# Patient Record
Sex: MALE | Race: WHITE | NOT HISPANIC OR LATINO | Employment: FULL TIME | URBAN - NONMETROPOLITAN AREA
[De-identification: names, ages, dates, MRNs, and addresses within clinical notes are randomized per-mention and may not be internally consistent; named-entity substitution may affect disease eponyms.]

---

## 2021-05-13 ENCOUNTER — OFFICE VISIT CONVERTED (OUTPATIENT)
Dept: FAMILY MEDICINE CLINIC | Age: 31
End: 2021-05-13
Attending: FAMILY MEDICINE

## 2021-05-17 ENCOUNTER — HOSPITAL ENCOUNTER (OUTPATIENT)
Dept: OTHER | Facility: HOSPITAL | Age: 31
Discharge: HOME | End: 2021-05-17
Attending: FAMILY MEDICINE

## 2021-05-17 LAB
ALBUMIN SERPL-MCNC: 4.3 G/DL (ref 3.5–5)
ALBUMIN/GLOB SERPL: 1.4 {RATIO} (ref 1.4–2.6)
ALP SERPL-CCNC: 66 U/L (ref 53–128)
ALT SERPL-CCNC: 38 U/L (ref 10–40)
ANION GAP SERPL CALC-SCNC: 14 MMOL/L (ref 8–19)
AST SERPL-CCNC: 22 U/L (ref 15–50)
BASOPHILS # BLD MANUAL: 0.04 10*3/UL (ref 0–0.2)
BASOPHILS NFR BLD MANUAL: 0.7 % (ref 0–3)
BILIRUB SERPL-MCNC: 0.43 MG/DL (ref 0.2–1.3)
BUN SERPL-MCNC: 14 MG/DL (ref 5–25)
BUN/CREAT SERPL: 17 {RATIO} (ref 6–20)
CALCIUM SERPL-MCNC: 9.1 MG/DL (ref 8.7–10.4)
CHLORIDE SERPL-SCNC: 101 MMOL/L (ref 99–111)
CHOLEST SERPL-MCNC: 227 MG/DL (ref 107–200)
CHOLEST/HDLC SERPL: 6 {RATIO} (ref 3–6)
CONV CO2: 26 MMOL/L (ref 22–32)
CONV TOTAL PROTEIN: 7.3 G/DL (ref 6.3–8.2)
CREAT UR-MCNC: 0.84 MG/DL (ref 0.7–1.2)
DEPRECATED RDW RBC AUTO: 42.7 FL
EOSINOPHIL # BLD MANUAL: 0.29 10*3/UL (ref 0–0.7)
EOSINOPHIL NFR BLD MANUAL: 5 % (ref 0–7)
ERYTHROCYTE [DISTWIDTH] IN BLOOD BY AUTOMATED COUNT: 12.3 % (ref 11.5–14.5)
GFR SERPLBLD BASED ON 1.73 SQ M-ARVRAT: >60 ML/MIN/{1.73_M2}
GLOBULIN UR ELPH-MCNC: 3 G/DL (ref 2–3.5)
GLUCOSE SERPL-MCNC: 92 MG/DL (ref 70–99)
GRANS (ABSOLUTE): 2.7 10*3/UL (ref 2–8)
GRANS: 46.9 % (ref 30–85)
HBA1C MFR BLD: 15.1 G/DL (ref 14–18)
HCT VFR BLD AUTO: 44.4 % (ref 42–52)
HDLC SERPL-MCNC: 38 MG/DL (ref 40–60)
IMM GRANULOCYTES # BLD: 0.02 10*3/UL (ref 0–0.54)
IMM GRANULOCYTES NFR BLD: 0.3 % (ref 0–0.43)
LDLC SERPL CALC-MCNC: 142 MG/DL (ref 70–100)
LYMPHOCYTES # BLD MANUAL: 2.14 10*3/UL (ref 1–5)
LYMPHOCYTES NFR BLD MANUAL: 9.9 % (ref 3–10)
MCH RBC QN AUTO: 31.6 PG (ref 27–31)
MCHC RBC AUTO-ENTMCNC: 34 G/DL (ref 33–37)
MCV RBC AUTO: 92.9 FL (ref 80–96)
MONOCYTES # BLD AUTO: 0.57 10*3/UL (ref 0.2–1.2)
OSMOLALITY SERPL CALC.SUM OF ELEC: 284 MOSM/KG (ref 273–304)
PLATELET # BLD AUTO: 235 10*3/UL (ref 130–400)
PMV BLD AUTO: 9.7 FL (ref 7.4–10.4)
POTASSIUM SERPL-SCNC: 4.2 MMOL/L (ref 3.5–5.3)
RBC # BLD AUTO: 4.78 10*6/UL (ref 4.7–6.1)
SODIUM SERPL-SCNC: 137 MMOL/L (ref 135–147)
TRIGL SERPL-MCNC: 236 MG/DL (ref 40–150)
TSH SERPL-ACNC: 1.62 M[IU]/L (ref 0.27–4.2)
VARIANT LYMPHS NFR BLD MANUAL: 37.2 % (ref 20–45)
VLDLC SERPL-MCNC: 47 MG/DL (ref 5–37)
WBC # BLD AUTO: 5.76 10*3/UL (ref 4.8–10.8)

## 2021-05-18 NOTE — PROGRESS NOTES
Clarence Muller  1990     Office/Outpatient Visit    Visit Date: Thu, May 13, 2021 04:06 pm    Provider: Joseluis Moralez MD (Assistant: Judith Ramirez, )    Location: [facility]        Electronically signed by Joseluis Moralez MD on  05/22/2021 06:26:42 PM                             Subjective:        CC: Mr. Muller is a 31 year old White male.  This is his first visit to the clinic.  discuss stomach issues, left foot pain         HPI:       Marv reports that he has been spearing seeing pain in his left foot/heel for quite some time now.  Pain is primary located at the base of the heel.  He denies any known inciting event or injury.  He is not currently take anything for symptoms          Concerning encounter for general adult medical examination without abnormal findings, his last physical exam was over 5 years ago.  He does not perform regular testicular self-exams.  He is current with his Td and influenza immunization.      Smoking Status:  Nonsmoker     ROS:     CONSTITUTIONAL:  Negative for chills, fatigue, fever, and weight change.      EYES:  Negative for blurred vision.      E/N/T:  Negative for ear pain and tinnitus.      CARDIOVASCULAR:  Negative for chest pain, dizziness, palpitations and edema.      RESPIRATORY:  Negative for dyspnea and cough.      GASTROINTESTINAL:  Negative for abdominal pain, constipation, diarrhea, heartburn, nausea and vomiting.      GENITOURINARY:  Negative for dysuria, hematuria and polyuria.      MUSCULOSKELETAL:  Positive for left foot pain.      INTEGUMENTARY:  Negative for rash.      NEUROLOGICAL:  Negative for headaches, paresthesias and weakness.      HEMATOLOGIC/LYMPHATIC:  Negative for easy bruising and excessive bleeding.      ENDOCRINE:  Negative for hair loss, heat/cold intolerance, polydipsia, and polyphagia.      PSYCHIATRIC:  Negative for anxiety, depression, and sleep disturbances.          Past Medical History / Family History / Social  "History:         Last Reviewed on 5/22/2021 06:26 PM by Joseluis Moralez    Past Medical History:             PAST MEDICAL HISTORY     UNREMARKABLE         Surgical History:         Positive for    Hernia Repair: left inguinal; ;         Family History:         Positive for Coronary Artery Disease, Hyperlipidemia, Hypertension and Myocardial Infarction;     Positive for Type 2 Diabetes;         Social History:     Occupation: ;     Marital Status: Engaged     Children: 1 child         Tobacco/Alcohol/Supplements:     Last Reviewed on 5/22/2021 06:26 PM by Joseluis Moralez    Tobacco: Former smoker         Substance Abuse History:     Last Reviewed on 5/22/2021 06:26 PM by Joseluis Moralez        Mental Health History:     Last Reviewed on 5/22/2021 06:26 PM by Joseluis Moralez        Communicable Diseases (eg STDs):     Last Reviewed on 5/22/2021 06:26 PM by Joseluis Moralez        Current Problems:     Last Reviewed on 5/22/2021 06:26 PM by Joseluis Moralez    Pain in left ankle and joints of left foot    Encounter for general adult medical examination without abnormal findings        Immunizations:     influenza, injectable, quadrivalent, preservative free 11/15/2020    SARS-COV-2 (COVID-19) vaccine, UNSPECIFIED 2/3/2021    SARS-COV-2 (COVID-19) vaccine, UNSPECIFIED 3/3/2021        Current Medications:     Last Reviewed on 5/22/2021 06:26 PM by Joseluis Moralez    None        Objective:        Vitals:         Current: 5/13/2021 4:13:47 PM    Ht:  5 ft, 11 in;  Wt: 236.4 lbs;  BMI: 33.0T: 96.7 F (temporal);  BP: 128/63 mm Hg (right arm, sitting);  P: 61 bpm (right arm (BP Cuff), sitting, regular)        Exams:     PHYSICAL EXAM:     GENERAL: Vitals recorded well developed, well nourished;  no apparent distress;     EYES: conjunctiva and cornea are normal;     RESPIRATORY: Clear to auscultation bilateally; no rales (\"crackles\") present; no rhonchi; no wheezes;     CARDIOVASCULAR: normal rate; rhythm is regular;  No murmurs, " clicks, gallops or rubs appreciated; no edema;     GASTROINTESTINAL: nontender; Soft and nondistended; normal bowel sounds; no organomegaly; no masses;     LYMPHATIC: no enlargement of cervical or facial nodes; no supraclavicular nodes;     SKIN:  No significant rashes, lesions or suspicious moles within limits of examination;     MUSCULOSKELETAL: No gross deformity or edema of left foot; Minimally tender to palpation;     NEUROLOGIC: Grossly intact; mental status: alert and oriented x 3;     PSYCHIATRIC: appropriate affect and demeanor; normal speech pattern; Normal behavior;         Assessment:         Z00.00   Encounter for general adult medical examination without abnormal findings       M25.572   Pain in left ankle and joints of left foot           ORDERS:         Lab Orders:       [id number]  [facility] PHYSICAL: CMP, CBC, TSH, LIPID: [id number], [id number]  (Send-Out)                      Plan:         Encounter for general adult medical examination without abnormal findings- Appropriate screenings and vaccinations were reviewed and offered as indicated.  Screening labs including CBC, CMP, TSH and lipid panel ordered.    LABORATORY:  Labs ordered to be performed today include PHYSICAL PANEL; CMP, CBC, TSH, LIPID.            Orders:       [id number]  [facility] PHYSICAL: CMP, CBC, TSH, LIPID: [id number], [id number]  (Send-Out)              Pain in left ankle and joints of left foot- Clinical picture appears to be consistent with tendinitis.  Ice, decreased activity and over-the-counter analgesics as needed.  If symptoms persist, will consider imaging and/or specialist referral.            Charge Capture:         Primary Diagnosis:     Z00.00  Encounter for general adult medical examination without abnormal findings           Orders:      [id number]  Preventive medicine, new patient, age 18-39 years  (In-House)              M25.572  Pain in left ankle and joints of left foot

## 2021-07-02 VITALS
SYSTOLIC BLOOD PRESSURE: 128 MMHG | BODY MASS INDEX: 33.1 KG/M2 | HEIGHT: 71 IN | HEART RATE: 61 BPM | DIASTOLIC BLOOD PRESSURE: 63 MMHG | TEMPERATURE: 96.7 F | WEIGHT: 236.4 LBS

## 2021-11-04 ENCOUNTER — OFFICE VISIT (OUTPATIENT)
Dept: FAMILY MEDICINE CLINIC | Age: 31
End: 2021-11-04

## 2021-11-04 VITALS
TEMPERATURE: 98.4 F | DIASTOLIC BLOOD PRESSURE: 76 MMHG | BODY MASS INDEX: 34.1 KG/M2 | HEIGHT: 71 IN | WEIGHT: 243.6 LBS | HEART RATE: 68 BPM | SYSTOLIC BLOOD PRESSURE: 115 MMHG

## 2021-11-04 DIAGNOSIS — D22.9 MULTIPLE ATYPICAL SKIN MOLES: Primary | ICD-10-CM

## 2021-11-04 PROCEDURE — 99213 OFFICE O/P EST LOW 20 MIN: CPT | Performed by: NURSE PRACTITIONER

## 2021-11-04 NOTE — PROGRESS NOTES
"Chief Complaint  Rash and Establish Care    Subjective    Patient is a 31 year old male in today with complaints of multiple moles and rashes that come and go. Patient reports an itchy rash that popped up in multiple areas over the summer but have now resolved.           Clarence Muller presents to [facility]  Rash  This is a recurrent problem. The problem has been resolved since onset. The rash is diffuse. The rash is characterized by itchiness and redness. He was exposed to a new detergent/soap. Past treatments include antihistamine. The treatment provided mild relief.       Objective   Vital Signs:   /76   Pulse 68   Temp 98.4 °F (36.9 °C) (Oral)   Ht 180.3 cm (70.98\")   Wt 110 kg (243 lb 9.6 oz)   BMI 33.99 kg/m²     Physical Exam  HENT:      Head: Normocephalic.   Cardiovascular:      Rate and Rhythm: Normal rate and regular rhythm.      Pulses: Normal pulses.      Heart sounds: Normal heart sounds.   Pulmonary:      Effort: Pulmonary effort is normal. No respiratory distress.      Breath sounds: Normal breath sounds. No stridor. No wheezing, rhonchi or rales.   Skin:     General: Skin is warm and dry.          Neurological:      Mental Status: He is alert and oriented to person, place, and time.   Psychiatric:         Mood and Affect: Mood normal.        Result Review :                 Assessment and Plan    Diagnoses and all orders for this visit:    1. Multiple atypical skin moles (Primary)  -     Ambulatory Referral to Dermatology        Follow Up   Return in about 6 months (around 5/4/2022), or if symptoms worsen or fail to improve.  Patient was given instructions and counseling regarding his condition or for health maintenance advice. Please see specific information pulled into the AVS if appropriate.       "

## 2023-06-30 PROBLEM — E66.9 OBESITY (BMI 30-39.9): Status: ACTIVE | Noted: 2023-06-30

## 2024-02-14 NOTE — PROGRESS NOTES
"Chief Complaint  Establish Care and Dizziness    Subjective          Clarence Muller presents to [facility]  History of Present Illness  He is here to establish care.  He has seen multiple providers by our practice.    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 He reports that the dizziness has greatly improved.    Elevated BP: He has been trying to work on lifestyle modifications through healthier eating and increasing his physical activity. He reports that he has chest pain, which he attributes to weight lifting. He reports that it is improving. The pain is reproducible with palpation. The pain is intermittent. No known triggers. It will last for \"just a little bit.\" He describes it as an ache.  He denies blurred vision, H/A, and pedal edema.      Objective   Vital Signs:   /76 (BP Location: Left arm, Patient Position: Sitting)   Pulse 67   Ht 180.3 cm (71\")   Wt 110 kg (243 lb)   SpO2 98% Comment: on room air  BMI 33.89 kg/m²       Vitals:    02/20/24 1438 02/20/24 1504   BP: 140/98 130/76   BP Location: Left arm Left arm   Patient Position: Sitting Sitting   Pulse: 67    SpO2: 98%  Comment: on room air    Weight: 110 kg (243 lb)    Height: 180.3 cm (71\")         Physical Exam  Constitutional:       General: He is not in acute distress.     Appearance: Normal appearance. He is normal weight.   HENT:      Head: Normocephalic.   Eyes:      Pupils: Pupils are equal, round, and reactive to light.      Visual Fields: Right eye visual fields normal and left eye visual fields normal.   Neck:      Trachea: Trachea normal.   Cardiovascular:      Rate and Rhythm: Normal rate and regular rhythm.      Heart sounds: Normal heart sounds.   Pulmonary:      Effort: Pulmonary effort is normal.      Breath sounds: Normal breath sounds and air entry.   Chest:      Chest wall: Tenderness present. "   Musculoskeletal:      Right lower leg: No edema.      Left lower leg: No edema.   Skin:     General: Skin is warm and dry.   Neurological:      Mental Status: He is alert and oriented to person, place, and time.   Psychiatric:         Mood and Affect: Mood and affect normal.         Behavior: Behavior normal.         Thought Content: Thought content normal.        Result Review :   The following data was reviewed by: SEYMOUR Nolan on 02/20/2024:                  Assessment and Plan    Diagnoses and all orders for this visit:    1. Elevated BP without diagnosis of hypertension (Primary)  Assessment & Plan:  He is going to work on lifestyle modifications to help lower his cholesterol and BP. I do recommend that he check his BP at home and see what it is running compared to the office setting.      2. Screening for thyroid disorder  -     TSH; Future    3. Preventative health care  -     CBC (No Diff); Future  -     Comprehensive Metabolic Panel; Future  -     Lipid Panel; Future  -     Hemoglobin A1c; Future  -     TSH; Future  -     Hepatitis C Antibody; Future    4. Screening for cholesterol level  -     Lipid Panel; Future    5. Screening for diabetes mellitus (DM)  -     Hemoglobin A1c; Future    6. Need for hepatitis C screening test  -     Hepatitis C Antibody; Future    7. Obesity (BMI 30-39.9)  Assessment & Plan:  Patient's (Body mass index is 33.89 kg/m².) indicates that they are obese (BMI >30) with health conditions that include  elevated BP  . Weight is unchanged. BMI  is above average; BMI management plan is completed. We discussed portion control and increasing exercise.       8. Dizziness  Comments:  Greatly improving. Continue current treatment.          Follow Up   Return for Annual physical.  Patient was given instructions and counseling regarding his condition or for health maintenance advice. Please see specific information pulled into the AVS if appropriate.

## 2024-02-20 ENCOUNTER — OFFICE VISIT (OUTPATIENT)
Dept: FAMILY MEDICINE CLINIC | Age: 34
End: 2024-02-20
Payer: COMMERCIAL

## 2024-02-20 VITALS
HEART RATE: 67 BPM | SYSTOLIC BLOOD PRESSURE: 130 MMHG | WEIGHT: 243 LBS | HEIGHT: 71 IN | OXYGEN SATURATION: 98 % | DIASTOLIC BLOOD PRESSURE: 76 MMHG | BODY MASS INDEX: 34.02 KG/M2

## 2024-02-20 DIAGNOSIS — R42 DIZZINESS: ICD-10-CM

## 2024-02-20 DIAGNOSIS — Z13.1 SCREENING FOR DIABETES MELLITUS (DM): ICD-10-CM

## 2024-02-20 DIAGNOSIS — R03.0 ELEVATED BP WITHOUT DIAGNOSIS OF HYPERTENSION: Primary | ICD-10-CM

## 2024-02-20 DIAGNOSIS — Z13.220 SCREENING FOR CHOLESTEROL LEVEL: ICD-10-CM

## 2024-02-20 DIAGNOSIS — Z11.59 NEED FOR HEPATITIS C SCREENING TEST: ICD-10-CM

## 2024-02-20 DIAGNOSIS — Z00.00 PREVENTATIVE HEALTH CARE: ICD-10-CM

## 2024-02-20 DIAGNOSIS — Z13.29 SCREENING FOR THYROID DISORDER: ICD-10-CM

## 2024-02-20 DIAGNOSIS — E66.9 OBESITY (BMI 30-39.9): ICD-10-CM

## 2024-02-20 RX ORDER — PREDNISONE 20 MG/1
TABLET ORAL
COMMUNITY
Start: 2024-02-16

## 2024-02-20 RX ORDER — OMEPRAZOLE 20 MG/1
20 CAPSULE, DELAYED RELEASE ORAL DAILY
COMMUNITY

## 2024-02-20 RX ORDER — CIPROFLOXACIN AND DEXAMETHASONE 3; 1 MG/ML; MG/ML
SUSPENSION/ DROPS AURICULAR (OTIC)
COMMUNITY
Start: 2024-02-17

## 2024-02-20 NOTE — ASSESSMENT & PLAN NOTE
He is going to work on lifestyle modifications to help lower his cholesterol and BP. I do recommend that he check his BP at home and see what it is running compared to the office setting.

## 2024-02-20 NOTE — ASSESSMENT & PLAN NOTE
Patient's (Body mass index is 33.89 kg/m².) indicates that they are obese (BMI >30) with health conditions that include  elevated BP  . Weight is unchanged. BMI  is above average; BMI management plan is completed. We discussed portion control and increasing exercise.

## 2024-03-01 ENCOUNTER — PATIENT ROUNDING (BHMG ONLY) (OUTPATIENT)
Dept: FAMILY MEDICINE CLINIC | Age: 34
End: 2024-03-01
Payer: COMMERCIAL

## 2024-03-01 ENCOUNTER — LAB (OUTPATIENT)
Dept: LAB | Facility: HOSPITAL | Age: 34
End: 2024-03-01
Payer: COMMERCIAL

## 2024-03-01 DIAGNOSIS — Z13.29 SCREENING FOR THYROID DISORDER: ICD-10-CM

## 2024-03-01 DIAGNOSIS — Z11.59 NEED FOR HEPATITIS C SCREENING TEST: ICD-10-CM

## 2024-03-01 DIAGNOSIS — Z13.220 SCREENING FOR CHOLESTEROL LEVEL: ICD-10-CM

## 2024-03-01 DIAGNOSIS — Z00.00 PREVENTATIVE HEALTH CARE: ICD-10-CM

## 2024-03-01 DIAGNOSIS — Z13.1 SCREENING FOR DIABETES MELLITUS (DM): ICD-10-CM

## 2024-03-01 LAB
ALBUMIN SERPL-MCNC: 4.5 G/DL (ref 3.5–5.2)
ALBUMIN/GLOB SERPL: 1.8 G/DL
ALP SERPL-CCNC: 71 U/L (ref 39–117)
ALT SERPL W P-5'-P-CCNC: 22 U/L (ref 1–41)
ANION GAP SERPL CALCULATED.3IONS-SCNC: 10 MMOL/L (ref 5–15)
AST SERPL-CCNC: 18 U/L (ref 1–40)
BILIRUB SERPL-MCNC: 0.4 MG/DL (ref 0–1.2)
BUN SERPL-MCNC: 12 MG/DL (ref 6–20)
BUN/CREAT SERPL: 14.1 (ref 7–25)
CALCIUM SPEC-SCNC: 9.3 MG/DL (ref 8.6–10.5)
CHLORIDE SERPL-SCNC: 105 MMOL/L (ref 98–107)
CHOLEST SERPL-MCNC: 226 MG/DL (ref 0–200)
CO2 SERPL-SCNC: 24 MMOL/L (ref 22–29)
CREAT SERPL-MCNC: 0.85 MG/DL (ref 0.76–1.27)
DEPRECATED RDW RBC AUTO: 44.1 FL (ref 37–54)
EGFRCR SERPLBLD CKD-EPI 2021: 118.4 ML/MIN/1.73
ERYTHROCYTE [DISTWIDTH] IN BLOOD BY AUTOMATED COUNT: 12.8 % (ref 12.3–15.4)
GLOBULIN UR ELPH-MCNC: 2.5 GM/DL
GLUCOSE SERPL-MCNC: 93 MG/DL (ref 65–99)
HBA1C MFR BLD: 5.7 % (ref 4.8–5.6)
HCT VFR BLD AUTO: 46.8 % (ref 37.5–51)
HCV AB SER DONR QL: NORMAL
HDLC SERPL-MCNC: 33 MG/DL (ref 40–60)
HGB BLD-MCNC: 15.5 G/DL (ref 13–17.7)
LDLC SERPL CALC-MCNC: 162 MG/DL (ref 0–100)
LDLC/HDLC SERPL: 4.83 {RATIO}
MCH RBC QN AUTO: 30.6 PG (ref 26.6–33)
MCHC RBC AUTO-ENTMCNC: 33.1 G/DL (ref 31.5–35.7)
MCV RBC AUTO: 92.5 FL (ref 79–97)
PLATELET # BLD AUTO: 227 10*3/MM3 (ref 140–450)
PMV BLD AUTO: 9.4 FL (ref 6–12)
POTASSIUM SERPL-SCNC: 4.4 MMOL/L (ref 3.5–5.2)
PROT SERPL-MCNC: 7 G/DL (ref 6–8.5)
RBC # BLD AUTO: 5.06 10*6/MM3 (ref 4.14–5.8)
SODIUM SERPL-SCNC: 139 MMOL/L (ref 136–145)
TRIGL SERPL-MCNC: 168 MG/DL (ref 0–150)
TSH SERPL DL<=0.05 MIU/L-ACNC: 0.9 UIU/ML (ref 0.27–4.2)
VLDLC SERPL-MCNC: 31 MG/DL (ref 5–40)
WBC NRBC COR # BLD AUTO: 4.34 10*3/MM3 (ref 3.4–10.8)

## 2024-03-01 PROCEDURE — 86803 HEPATITIS C AB TEST: CPT

## 2024-03-01 PROCEDURE — 36415 COLL VENOUS BLD VENIPUNCTURE: CPT

## 2024-03-01 PROCEDURE — 80050 GENERAL HEALTH PANEL: CPT

## 2024-03-01 PROCEDURE — 83036 HEMOGLOBIN GLYCOSYLATED A1C: CPT

## 2024-03-01 PROCEDURE — 80061 LIPID PANEL: CPT

## 2024-03-01 NOTE — PROGRESS NOTES
March 1, 2024    Hello, may I speak with Clarence Muller?    My name is AJ     I am  with [facility]  [address]  [facility] [place] [address] [phone].    Before we get started may I verify your date of birth? 1990      I am calling to officially welcome you to our practice and ask about your recent visit. Is this a good time to talk? YES    Tell me about your visit with us. What things went well?  My visit was great. I really liked Stella Reed, she's good.       We're always looking for ways to make our patients' experiences even better. Do you have recommendations on ways we may improve?  NO    Overall were you satisfied with your first visit to our practice? YES       I appreciate you taking the time to speak with me today. Is there anything else I can do for you? NO      Thank you, and have a great day.

## 2024-06-24 PROBLEM — R73.03 PREDIABETES: Status: ACTIVE | Noted: 2024-06-24

## 2024-06-24 NOTE — PROGRESS NOTES
"Chief Complaint  Annual Exam    Subjective          Clarence Muller presents to [facility]  History of Present Illness  He is here for physical.    He thinks that his tetanus is UTD.  He has had 3 doses of the COVID-vaccine; he's not interested in anymore vaccines. He doesn't do testicular self exams. He goes to the dentist regularly. He is trying to work on his diet. He is restarting to exercise. He doesn't drink a lot of water daily.      He reports that his dizziness has improved with Zyrtec.       Objective   Vital Signs:   /69 (BP Location: Left arm, Patient Position: Sitting)   Pulse 84   Ht 180.3 cm (71\")   Wt 112 kg (246 lb)   BMI 34.31 kg/m²     Physical Exam  Constitutional:       General: He is not in acute distress.     Appearance: Normal appearance. He is well-developed. He is obese.   HENT:      Head: Normocephalic.      Right Ear: Tympanic membrane, ear canal and external ear normal.      Left Ear: Tympanic membrane, ear canal and external ear normal.      Mouth/Throat:      Mouth: Mucous membranes are moist.      Pharynx: Oropharynx is clear. No oropharyngeal exudate or posterior oropharyngeal erythema.   Eyes:      Extraocular Movements: Extraocular movements intact.      Conjunctiva/sclera: Conjunctivae normal.      Pupils: Pupils are equal, round, and reactive to light.      Visual Fields: Right eye visual fields normal and left eye visual fields normal.   Cardiovascular:      Rate and Rhythm: Normal rate and regular rhythm.      Heart sounds: Normal heart sounds.   Pulmonary:      Effort: Pulmonary effort is normal. No retractions.      Breath sounds: Normal breath sounds and air entry.   Abdominal:      General: Abdomen is flat. Bowel sounds are normal. There is no distension.      Palpations: Abdomen is soft. There is no mass.      Tenderness: There is no abdominal tenderness.   Musculoskeletal:         General: Normal range of motion.      Cervical " back: Normal range of motion and neck supple.      Right lower leg: No edema.      Left lower leg: No edema.   Lymphadenopathy:      Cervical: No cervical adenopathy.   Skin:     General: Skin is warm and dry.   Neurological:      Mental Status: He is alert and oriented to person, place, and time.      Coordination: Coordination normal.      Gait: Gait normal.   Psychiatric:         Mood and Affect: Mood and affect normal.         Behavior: Behavior normal.         Thought Content: Thought content normal.        Result Review :   The following data was reviewed by: SEYMOUR Nolan on 07/02/2024:                  Assessment and Plan    Diagnoses and all orders for this visit:    1. Annual physical exam (Primary)  -     CBC (No Diff); Future  -     Comprehensive Metabolic Panel; Future  -     Lipid Panel; Future  -     Hemoglobin A1c; Future  -     TSH; Future    2. Preventative health care  -     CBC (No Diff); Future  -     Comprehensive Metabolic Panel; Future  -     Lipid Panel; Future  -     Hemoglobin A1c; Future  -     TSH; Future    3. Screening for diabetes mellitus (DM)  -     Hemoglobin A1c; Future    4. Screening for cholesterol level  -     Lipid Panel; Future    5. Screening for thyroid disorder  -     TSH; Future    Preventative measures discussed.      Follow Up   Return in about 6 months (around 1/2/2025) for prediabetes.  Patient was given instructions and counseling regarding his condition or for health maintenance advice. Please see specific information pulled into the AVS if appropriate.

## 2024-07-02 ENCOUNTER — OFFICE VISIT (OUTPATIENT)
Dept: FAMILY MEDICINE CLINIC | Age: 34
End: 2024-07-02
Payer: COMMERCIAL

## 2024-07-02 VITALS
DIASTOLIC BLOOD PRESSURE: 69 MMHG | HEART RATE: 84 BPM | HEIGHT: 71 IN | SYSTOLIC BLOOD PRESSURE: 117 MMHG | WEIGHT: 246 LBS | BODY MASS INDEX: 34.44 KG/M2

## 2024-07-02 DIAGNOSIS — Z13.29 SCREENING FOR THYROID DISORDER: ICD-10-CM

## 2024-07-02 DIAGNOSIS — Z13.1 SCREENING FOR DIABETES MELLITUS (DM): ICD-10-CM

## 2024-07-02 DIAGNOSIS — Z00.00 ANNUAL PHYSICAL EXAM: Primary | ICD-10-CM

## 2024-07-02 DIAGNOSIS — Z00.00 PREVENTATIVE HEALTH CARE: ICD-10-CM

## 2024-07-02 DIAGNOSIS — Z13.220 SCREENING FOR CHOLESTEROL LEVEL: ICD-10-CM

## 2024-07-02 PROCEDURE — 99395 PREV VISIT EST AGE 18-39: CPT | Performed by: NURSE PRACTITIONER

## 2024-07-02 RX ORDER — CETIRIZINE HYDROCHLORIDE 10 MG/1
10 TABLET ORAL DAILY
COMMUNITY

## 2024-07-03 ENCOUNTER — LAB (OUTPATIENT)
Dept: LAB | Facility: HOSPITAL | Age: 34
End: 2024-07-03
Payer: COMMERCIAL

## 2024-07-03 DIAGNOSIS — Z13.1 SCREENING FOR DIABETES MELLITUS (DM): ICD-10-CM

## 2024-07-03 DIAGNOSIS — Z13.220 SCREENING FOR CHOLESTEROL LEVEL: ICD-10-CM

## 2024-07-03 DIAGNOSIS — Z00.00 PREVENTATIVE HEALTH CARE: ICD-10-CM

## 2024-07-03 DIAGNOSIS — Z13.29 SCREENING FOR THYROID DISORDER: ICD-10-CM

## 2024-07-03 DIAGNOSIS — Z00.00 ANNUAL PHYSICAL EXAM: ICD-10-CM

## 2024-07-03 LAB
ALBUMIN SERPL-MCNC: 4.6 G/DL (ref 3.5–5.2)
ALBUMIN/GLOB SERPL: 1.8 G/DL
ALP SERPL-CCNC: 63 U/L (ref 39–117)
ALT SERPL W P-5'-P-CCNC: 42 U/L (ref 1–41)
ANION GAP SERPL CALCULATED.3IONS-SCNC: 12.4 MMOL/L (ref 5–15)
AST SERPL-CCNC: 24 U/L (ref 1–40)
BILIRUB SERPL-MCNC: 0.4 MG/DL (ref 0–1.2)
BUN SERPL-MCNC: 13 MG/DL (ref 6–20)
BUN/CREAT SERPL: 15.3 (ref 7–25)
CALCIUM SPEC-SCNC: 9.6 MG/DL (ref 8.6–10.5)
CHLORIDE SERPL-SCNC: 101 MMOL/L (ref 98–107)
CHOLEST SERPL-MCNC: 296 MG/DL (ref 0–200)
CO2 SERPL-SCNC: 24.6 MMOL/L (ref 22–29)
CREAT SERPL-MCNC: 0.85 MG/DL (ref 0.76–1.27)
DEPRECATED RDW RBC AUTO: 42.2 FL (ref 37–54)
EGFRCR SERPLBLD CKD-EPI 2021: 118.4 ML/MIN/1.73
ERYTHROCYTE [DISTWIDTH] IN BLOOD BY AUTOMATED COUNT: 12.6 % (ref 12.3–15.4)
GLOBULIN UR ELPH-MCNC: 2.6 GM/DL
GLUCOSE SERPL-MCNC: 85 MG/DL (ref 65–99)
HBA1C MFR BLD: 5.4 % (ref 4.8–5.6)
HCT VFR BLD AUTO: 46.9 % (ref 37.5–51)
HDLC SERPL-MCNC: 46 MG/DL (ref 40–60)
HGB BLD-MCNC: 16.1 G/DL (ref 13–17.7)
LDLC SERPL CALC-MCNC: 210 MG/DL (ref 0–100)
LDLC/HDLC SERPL: 4.53 {RATIO}
MCH RBC QN AUTO: 31.4 PG (ref 26.6–33)
MCHC RBC AUTO-ENTMCNC: 34.3 G/DL (ref 31.5–35.7)
MCV RBC AUTO: 91.4 FL (ref 79–97)
PLATELET # BLD AUTO: 224 10*3/MM3 (ref 140–450)
PMV BLD AUTO: 9.6 FL (ref 6–12)
POTASSIUM SERPL-SCNC: 4.5 MMOL/L (ref 3.5–5.2)
PROT SERPL-MCNC: 7.2 G/DL (ref 6–8.5)
RBC # BLD AUTO: 5.13 10*6/MM3 (ref 4.14–5.8)
SODIUM SERPL-SCNC: 138 MMOL/L (ref 136–145)
TRIGL SERPL-MCNC: 208 MG/DL (ref 0–150)
TSH SERPL DL<=0.05 MIU/L-ACNC: 1.51 UIU/ML (ref 0.27–4.2)
VLDLC SERPL-MCNC: 40 MG/DL (ref 5–40)
WBC NRBC COR # BLD AUTO: 6.98 10*3/MM3 (ref 3.4–10.8)

## 2024-07-03 PROCEDURE — 80050 GENERAL HEALTH PANEL: CPT

## 2024-07-03 PROCEDURE — 80061 LIPID PANEL: CPT

## 2024-07-03 PROCEDURE — 83036 HEMOGLOBIN GLYCOSYLATED A1C: CPT

## 2024-07-03 PROCEDURE — 36415 COLL VENOUS BLD VENIPUNCTURE: CPT

## 2024-07-09 DIAGNOSIS — E78.2 MIXED HYPERLIPIDEMIA: Primary | ICD-10-CM

## 2024-07-09 RX ORDER — ATORVASTATIN CALCIUM 20 MG/1
20 TABLET, FILM COATED ORAL DAILY
Qty: 90 TABLET | Refills: 1 | Status: SHIPPED | OUTPATIENT
Start: 2024-07-09

## 2024-12-16 NOTE — PROGRESS NOTES
"Chief Complaint  Prediabetes and Hyperlipidemia    Subjective          Clarence Muller presents to [facility]  History of Present Illness  He returns for follow-up.    HLD: He is taking atorvastatin 20 mg daily.  His last lipid panel from July showed his cholesterol 296, triglycerides 208, and .  He was started on atorvastatin after that. He reports that he has some soreness.     His wife reports that he is having a lot snoring and would like for him to complete a sleep study.     His A1c in the past was 5.7, but his last A1c was 5.4.      Objective   Vital Signs:   /67 (BP Location: Right arm, Patient Position: Sitting, Cuff Size: Large Adult)   Pulse 65   Temp 97.5 °F (36.4 °C) (Oral)   Ht 180.3 cm (71\")   Wt 113 kg (248 lb 3.2 oz)   SpO2 95%   BMI 34.62 kg/m²     Physical Exam  Constitutional:       General: He is not in acute distress.     Appearance: Normal appearance. He is normal weight.   HENT:      Head: Normocephalic.   Eyes:      Pupils: Pupils are equal, round, and reactive to light.      Visual Fields: Right eye visual fields normal and left eye visual fields normal.   Neck:      Trachea: Trachea normal.   Cardiovascular:      Rate and Rhythm: Normal rate and regular rhythm.      Heart sounds: Normal heart sounds.   Pulmonary:      Effort: Pulmonary effort is normal.      Breath sounds: Normal breath sounds and air entry.   Musculoskeletal:      Right lower leg: No edema.      Left lower leg: No edema.   Skin:     General: Skin is warm and dry.   Neurological:      Mental Status: He is alert and oriented to person, place, and time.   Psychiatric:         Mood and Affect: Mood and affect normal.         Behavior: Behavior normal.         Thought Content: Thought content normal.        Result Review :   The following data was reviewed by: SEYMOUR Nolan on 01/03/2025:                  Assessment and Plan    Diagnoses and all orders for this " visit:    1. Mixed hyperlipidemia (Primary)  Assessment & Plan:   We have discussed possibly switching to pravastatin.  He would like to see where his lipid panel is.    Orders:  -     CBC (No Diff); Future  -     Comprehensive Metabolic Panel; Future  -     Lipid Panel; Future    2. Screening for diabetes mellitus (DM)  -     Hemoglobin A1c; Future    3. Need for vaccination  -     Fluzone >6mos ([id number])    4. Snoring  -     Ambulatory Referral to Sleep Medicine                Follow Up   Return in about 6 months (around 7/3/2025).  Patient was given instructions and counseling regarding his condition or for health maintenance advice. Please see specific information pulled into the AVS if appropriate.

## 2025-01-03 ENCOUNTER — OFFICE VISIT (OUTPATIENT)
Dept: FAMILY MEDICINE CLINIC | Age: 35
End: 2025-01-03
Payer: COMMERCIAL

## 2025-01-03 VITALS
OXYGEN SATURATION: 95 % | TEMPERATURE: 97.5 F | HEART RATE: 65 BPM | WEIGHT: 248.2 LBS | BODY MASS INDEX: 34.75 KG/M2 | DIASTOLIC BLOOD PRESSURE: 67 MMHG | SYSTOLIC BLOOD PRESSURE: 121 MMHG | HEIGHT: 71 IN

## 2025-01-03 DIAGNOSIS — Z13.1 SCREENING FOR DIABETES MELLITUS (DM): ICD-10-CM

## 2025-01-03 DIAGNOSIS — E78.2 MIXED HYPERLIPIDEMIA: Primary | ICD-10-CM

## 2025-01-03 DIAGNOSIS — R06.83 SNORING: ICD-10-CM

## 2025-01-03 DIAGNOSIS — Z23 NEED FOR VACCINATION: ICD-10-CM

## 2025-01-03 PROCEDURE — 99213 OFFICE O/P EST LOW 20 MIN: CPT | Performed by: NURSE PRACTITIONER

## 2025-01-03 PROCEDURE — 90471 IMMUNIZATION ADMIN: CPT | Performed by: NURSE PRACTITIONER

## 2025-01-03 PROCEDURE — 90656 IIV3 VACC NO PRSV 0.5 ML IM: CPT | Performed by: NURSE PRACTITIONER

## 2025-01-03 NOTE — ASSESSMENT & PLAN NOTE
We have discussed possibly switching to pravastatin.  He would like to see where his lipid panel is.

## 2025-01-08 ENCOUNTER — OFFICE VISIT (OUTPATIENT)
Dept: SLEEP MEDICINE | Facility: HOSPITAL | Age: 35
End: 2025-01-08
Payer: COMMERCIAL

## 2025-01-08 VITALS
BODY MASS INDEX: 34.8 KG/M2 | DIASTOLIC BLOOD PRESSURE: 70 MMHG | SYSTOLIC BLOOD PRESSURE: 112 MMHG | WEIGHT: 248.6 LBS | HEIGHT: 71 IN | OXYGEN SATURATION: 95 % | HEART RATE: 83 BPM

## 2025-01-08 DIAGNOSIS — R29.818 SUSPECTED SLEEP APNEA: Primary | ICD-10-CM

## 2025-01-08 DIAGNOSIS — R06.83 SNORING: ICD-10-CM

## 2025-01-08 DIAGNOSIS — R73.03 PREDIABETES: ICD-10-CM

## 2025-01-08 DIAGNOSIS — G47.63 SLEEP-RELATED BRUXISM: ICD-10-CM

## 2025-01-08 DIAGNOSIS — R03.0 ELEVATED BP WITHOUT DIAGNOSIS OF HYPERTENSION: ICD-10-CM

## 2025-01-08 DIAGNOSIS — E66.9 OBESITY (BMI 30-39.9): ICD-10-CM

## 2025-01-08 PROCEDURE — G0463 HOSPITAL OUTPT CLINIC VISIT: HCPCS

## 2025-01-08 NOTE — PROGRESS NOTES
Sleep Consultation    Patient Name: Clarence Muller  Age/Sex: 34 y.o. male  : 1990  MRN: [id number]    Date of Encounter Visit: 2025  Encounter Provider: Sally Smith MD  Referring Provider: Ingris Holley*  Place of Service: [facility]  Patient Care Team:  Ingris Reed APRN as PCP - General (Nurse Practitioner)    Subjective:     Reason for Consult: Obstructive sleep apnea    History of Present Illness:  Clarence Muller is a 34 y.o. male is here for evaluation of ADELFO due to suggestive symptoms.    Patient complains of daytime fatigue and sleepiness with an Sal[place] Sleepiness Scale (ESS) of 7.  Patient complains of loud snoring in all position but no witnessed apnea  Wife is a respiratory therapist  Patient has some nocturnal bruxism  Denies any symptoms of restless leg syndrome.   Patient denies any cataplexy, sleep paralysis or other symptoms to suggest narcolepsy.  Patient denies any parasomnias.  Denies any history of seizure disorder or recent head trauma.  Patient spends adequate amount of time in bed with no evidence of sleep restriction or improper sleep hygiene.  Typical bedtime is around 11 PM, wake up time is 7 in the morning with 7-8 hours of sleep in between, sleep onset up to 30 minutes and patient wakes up feeling little groggy  He loves his caffeine, he does have a cup of coffee in the morning and he  take some caffeine powder in the afternoon.  Former smoker quit smoking 8 years ago  Consumes alcohol mainly over the weekends  Positive history of recreational drug use in the past starting in  Comorbidities include: elevated BP, prediabetes, obesity     Review of Systems:   A twelve-system review was conducted and was negative except for the following: frequent heartburn .        Past Medical History:  Past Medical History:   Diagnosis Date    GERD (gastroesophageal reflux disease)     Hyperlipidemia        Past Surgical History:  "  Procedure Laterality Date    ENDOSCOPY      HERNIA REPAIR         Home Medications:     Current Outpatient Medications:     aluminum hydroxide-mag carbonate (GAVISCON EXTRA RELIEF) 160-105 MG chewable tablet chewable tablet, Chew 4 (Four) Times a Day With Meals & at Bedtime., Disp: , Rfl:     atorvastatin (LIPITOR) 20 MG tablet, Take 1 tablet by mouth Daily., Disp: 90 tablet, Rfl: 1    omeprazole (priLOSEC) 20 MG capsule, Take 1 capsule by mouth Daily., Disp: , Rfl:     cetirizine (ZyrTEC Allergy) 10 MG tablet, Take 1 tablet by mouth Daily. (Patient not taking: Reported on 1/8/2025), Disp: , Rfl:     Allergies:  No Known Allergies    Past Social History:  Social History     Socioeconomic History    Marital status:    Tobacco Use    Smoking status: Never    Smokeless tobacco: Former   Vaping Use    Vaping status: Never Used   Substance and Sexual Activity    Alcohol use: Yes     Comment: social     Drug use: Never    Sexual activity: Defer       Past Family History:  Family History   Problem Relation Age of Onset    No Known Problems Mother     Alcohol abuse Father     Hyperlipidemia Father     Heart attack Father     Pancreatitis Father     Liver disease Father     Hyperlipidemia Paternal Grandmother     Hyperlipidemia Paternal Grandfather      Positive for snoring and several family members but none of them was formally evaluated with a sleep study.  Objective:        Vital Signs:   Visit Vitals  /70   Pulse 83   Ht 180.3 cm (71\")   Wt 113 kg (248 lb 9.6 oz)   SpO2 95%   BMI 34.67 kg/m²     Wt Readings from Last 3 Encounters:   01/08/25 113 kg (248 lb 9.6 oz)   01/03/25 113 kg (248 lb 3.2 oz)   07/02/24 112 kg (246 lb)     Neck Circumference: 16.5 inches    Physical Exam:   GEN:  No acute distress, alert, cooperative, well developed, obese   EYES:   Sclerae clear. No icterus. PERRL. Normal EOM  ENT:   External ears/nose normal, no oral lesions, no thrush, mucous membranes moist, Septum midline. " Mallampati III airway. Redundant membranous soft palate, enlarged uvula   NECK:  Supple, midline trachea, no JVD  LUNGS: Normal chest on inspection, CTAB, no wheezes. No rhonchi. No crackles. Respirations regular, even and unlabored.   CV:  Regular rhythm and rate. Normal S1/S2. No murmurs, gallops, or rubs noted.  ABD:  Soft, nontender and nondistended. Normal bowel sounds. No guarding  EXT:  Moves all extremities well. No cyanosis. No redness. No edema.   Skin: Dry, intact, no bleeding      Diagnostic Data:  No prior sleep studies performed to review     Assessment and Plan:       ICD-10-CM ICD-9-CM   1. Suspected sleep apnea  R29.818 781.99   2. Snoring  R06.83 786.09   3. Obesity (BMI 30-39.9)  E66.9 278.00   4. Sleep-related bruxism  G47.63 327.53   5. Prediabetes  R73.03 790.29   6. Elevated BP without diagnosis of hypertension  R03.0 796.2       Recommendations:     Patient is a good candidate for the home sleep study which will be ordered today  If the home sleep study is inconclusive or nondiagnostic, we will consider the in-lab sleep study  Patient is agreeable with the CPAP therapy and will be initiated accordingly      Patient was educated in depth about ADELFO and cardiovascular consequences if left untreated, including but not limited to CHF, CAD, arrhythmias, CVA, and/or HTN. Education also provided about the diagnostic tools for ADELFO, including the polysomnography and the treatment modalities, including the CPAP.     If patient has obstructive sleep apnea the recommend treatment is CPAP and will start CPAP and patient will follow up within 31-90 days after starting CPAP for compliance review.   Will address alternative treatment option if intolerant to CPAP     Adherence to the CPAP is a key factor in successful treatment of ADELFO and the patient was encouraged to contact us in case of problem with the CPAP or the mask that can limit the tolerance of the compliance with the therapy.    Patient was educated  about the impact of obesity on sleep apnea and the benefit of weight loss and weight loss was recommended    Orders Placed This Encounter   Procedures    Home Sleep Study     No orders of the defined types were placed in this encounter.     Return in about 3 months (around 4/8/2025).    Sally Smith MD   [facility]   01/08/25  16:08 EST    Dictated utilizing Dragon dictation

## 2025-01-16 ENCOUNTER — HOSPITAL ENCOUNTER (OUTPATIENT)
Dept: SLEEP MEDICINE | Facility: HOSPITAL | Age: 35
Discharge: HOME | End: 2025-01-16
Admitting: INTERNAL MEDICINE
Payer: COMMERCIAL

## 2025-01-16 DIAGNOSIS — R06.83 SNORING: ICD-10-CM

## 2025-01-16 DIAGNOSIS — E66.9 OBESITY (BMI 30-39.9): ICD-10-CM

## 2025-01-16 DIAGNOSIS — G47.63 SLEEP-RELATED BRUXISM: ICD-10-CM

## 2025-01-16 DIAGNOSIS — R29.818 SUSPECTED SLEEP APNEA: ICD-10-CM

## 2025-01-16 PROCEDURE — G0399 HOME SLEEP TEST/TYPE 3 PORTA: HCPCS

## 2025-01-23 ENCOUNTER — TELEPHONE (OUTPATIENT)
Dept: FAMILY MEDICINE CLINIC | Age: 35
End: 2025-01-23
Payer: COMMERCIAL

## 2025-01-23 NOTE — TELEPHONE ENCOUNTER
2nd attempt. Spoke to pt regarding overdue fasting labs ordered on 1/3/25 by Ingris Reed. Pt states that he will be in on Friday, 1/23/25 to complete these. Letter mailed.

## 2025-01-24 ENCOUNTER — LAB (OUTPATIENT)
Dept: LAB | Facility: HOSPITAL | Age: 35
End: 2025-01-24
Payer: COMMERCIAL

## 2025-01-24 DIAGNOSIS — E78.2 MIXED HYPERLIPIDEMIA: ICD-10-CM

## 2025-01-24 DIAGNOSIS — Z13.1 SCREENING FOR DIABETES MELLITUS (DM): ICD-10-CM

## 2025-01-24 LAB
ALBUMIN SERPL-MCNC: 4.2 G/DL (ref 3.5–5.2)
ALBUMIN/GLOB SERPL: 1.4 G/DL
ALP SERPL-CCNC: 72 U/L (ref 39–117)
ALT SERPL W P-5'-P-CCNC: 30 U/L (ref 1–41)
ANION GAP SERPL CALCULATED.3IONS-SCNC: 19.3 MMOL/L (ref 5–15)
AST SERPL-CCNC: 17 U/L (ref 1–40)
BILIRUB SERPL-MCNC: 0.7 MG/DL (ref 0–1.2)
BUN SERPL-MCNC: 14 MG/DL (ref 6–20)
BUN/CREAT SERPL: 19.7 (ref 7–25)
CALCIUM SPEC-SCNC: 9.5 MG/DL (ref 8.6–10.5)
CHLORIDE SERPL-SCNC: 95 MMOL/L (ref 98–107)
CHOLEST SERPL-MCNC: 158 MG/DL (ref 0–200)
CO2 SERPL-SCNC: 22.7 MMOL/L (ref 22–29)
CREAT SERPL-MCNC: 0.71 MG/DL (ref 0.76–1.27)
DEPRECATED RDW RBC AUTO: 42.1 FL (ref 37–54)
EGFRCR SERPLBLD CKD-EPI 2021: 125 ML/MIN/1.73
ERYTHROCYTE [DISTWIDTH] IN BLOOD BY AUTOMATED COUNT: 12.6 % (ref 12.3–15.4)
GLOBULIN UR ELPH-MCNC: 2.9 GM/DL
GLUCOSE SERPL-MCNC: 89 MG/DL (ref 65–99)
HBA1C MFR BLD: 5.6 % (ref 4.8–5.6)
HCT VFR BLD AUTO: 46.5 % (ref 37.5–51)
HDLC SERPL-MCNC: 38 MG/DL (ref 40–60)
HGB BLD-MCNC: 15.6 G/DL (ref 13–17.7)
LDLC SERPL CALC-MCNC: 100 MG/DL (ref 0–100)
LDLC/HDLC SERPL: 2.59 {RATIO}
MCH RBC QN AUTO: 30.4 PG (ref 26.6–33)
MCHC RBC AUTO-ENTMCNC: 33.5 G/DL (ref 31.5–35.7)
MCV RBC AUTO: 90.5 FL (ref 79–97)
PLATELET # BLD AUTO: 267 10*3/MM3 (ref 140–450)
PMV BLD AUTO: 9.1 FL (ref 6–12)
POTASSIUM SERPL-SCNC: 4 MMOL/L (ref 3.5–5.2)
PROT SERPL-MCNC: 7.1 G/DL (ref 6–8.5)
RBC # BLD AUTO: 5.14 10*6/MM3 (ref 4.14–5.8)
SODIUM SERPL-SCNC: 137 MMOL/L (ref 136–145)
TRIGL SERPL-MCNC: 108 MG/DL (ref 0–150)
VLDLC SERPL-MCNC: 20 MG/DL (ref 5–40)
WBC NRBC COR # BLD AUTO: 9.54 10*3/MM3 (ref 3.4–10.8)

## 2025-01-24 PROCEDURE — 36415 COLL VENOUS BLD VENIPUNCTURE: CPT

## 2025-01-24 PROCEDURE — 80053 COMPREHEN METABOLIC PANEL: CPT

## 2025-01-24 PROCEDURE — 80061 LIPID PANEL: CPT

## 2025-01-24 PROCEDURE — 85027 COMPLETE CBC AUTOMATED: CPT

## 2025-01-24 PROCEDURE — 83036 HEMOGLOBIN GLYCOSYLATED A1C: CPT

## 2025-01-28 DIAGNOSIS — G47.34 SLEEP RELATED HYPOXIA: ICD-10-CM

## 2025-01-28 DIAGNOSIS — R06.83 SNORING: ICD-10-CM

## 2025-01-28 DIAGNOSIS — G47.33 OSA (OBSTRUCTIVE SLEEP APNEA): Primary | ICD-10-CM

## 2025-01-29 ENCOUNTER — TELEPHONE (OUTPATIENT)
Dept: SLEEP MEDICINE | Facility: HOSPITAL | Age: 35
End: 2025-01-29
Payer: COMMERCIAL

## 2025-02-07 ENCOUNTER — TELEPHONE (OUTPATIENT)
Dept: SLEEP MEDICINE | Facility: HOSPITAL | Age: 35
End: 2025-02-07
Payer: COMMERCIAL

## 2025-02-10 ENCOUNTER — LAB (OUTPATIENT)
Dept: LAB | Facility: HOSPITAL | Age: 35
End: 2025-02-10
Payer: COMMERCIAL

## 2025-02-10 ENCOUNTER — OFFICE VISIT (OUTPATIENT)
Dept: FAMILY MEDICINE CLINIC | Age: 35
End: 2025-02-10
Payer: COMMERCIAL

## 2025-02-10 VITALS
HEIGHT: 71 IN | WEIGHT: 253 LBS | HEART RATE: 79 BPM | BODY MASS INDEX: 35.42 KG/M2 | OXYGEN SATURATION: 98 % | TEMPERATURE: 97.6 F | DIASTOLIC BLOOD PRESSURE: 70 MMHG | SYSTOLIC BLOOD PRESSURE: 116 MMHG

## 2025-02-10 DIAGNOSIS — R73.03 PREDIABETES: ICD-10-CM

## 2025-02-10 DIAGNOSIS — R60.0 BILATERAL LEG EDEMA: Primary | ICD-10-CM

## 2025-02-10 DIAGNOSIS — E66.9 OBESITY (BMI 30-39.9): ICD-10-CM

## 2025-02-10 DIAGNOSIS — E78.2 MIXED HYPERLIPIDEMIA: ICD-10-CM

## 2025-02-10 LAB
ALBUMIN SERPL-MCNC: 4.2 G/DL (ref 3.5–5.2)
ALBUMIN/GLOB SERPL: 1.4 G/DL
ALP SERPL-CCNC: 61 U/L (ref 39–117)
ALT SERPL W P-5'-P-CCNC: 30 U/L (ref 1–41)
ANION GAP SERPL CALCULATED.3IONS-SCNC: 7.8 MMOL/L (ref 5–15)
AST SERPL-CCNC: 19 U/L (ref 1–40)
BACTERIA UR QL AUTO: NORMAL /HPF
BASOPHILS # BLD AUTO: 0.08 10*3/MM3 (ref 0–0.2)
BASOPHILS NFR BLD AUTO: 0.9 % (ref 0–1.5)
BILIRUB SERPL-MCNC: 0.2 MG/DL (ref 0–1.2)
BILIRUB UR QL STRIP: NEGATIVE
BUN SERPL-MCNC: 14 MG/DL (ref 6–20)
BUN/CREAT SERPL: 17.9 (ref 7–25)
CALCIUM SPEC-SCNC: 9.3 MG/DL (ref 8.6–10.5)
CHLORIDE SERPL-SCNC: 102 MMOL/L (ref 98–107)
CK SERPL-CCNC: 111 U/L (ref 20–200)
CLARITY UR: CLEAR
CO2 SERPL-SCNC: 28.2 MMOL/L (ref 22–29)
COLOR UR: YELLOW
CREAT SERPL-MCNC: 0.78 MG/DL (ref 0.76–1.27)
CREAT UR-MCNC: 57.9 MG/DL
D DIMER PPP FEU-MCNC: 1.23 MCGFEU/ML (ref 0–0.5)
DEPRECATED RDW RBC AUTO: 39.1 FL (ref 37–54)
EGFRCR SERPLBLD CKD-EPI 2021: 120.8 ML/MIN/1.73
EOSINOPHIL # BLD AUTO: 0.44 10*3/MM3 (ref 0–0.4)
EOSINOPHIL NFR BLD AUTO: 5.1 % (ref 0.3–6.2)
ERYTHROCYTE [DISTWIDTH] IN BLOOD BY AUTOMATED COUNT: 11.8 % (ref 12.3–15.4)
GLOBULIN UR ELPH-MCNC: 3.1 GM/DL
GLUCOSE SERPL-MCNC: 98 MG/DL (ref 65–99)
GLUCOSE UR STRIP-MCNC: NEGATIVE MG/DL
HCT VFR BLD AUTO: 42.8 % (ref 37.5–51)
HGB BLD-MCNC: 14.4 G/DL (ref 13–17.7)
HGB UR QL STRIP.AUTO: NEGATIVE
HYALINE CASTS UR QL AUTO: NORMAL /LPF
IMM GRANULOCYTES # BLD AUTO: 0.1 10*3/MM3 (ref 0–0.05)
IMM GRANULOCYTES NFR BLD AUTO: 1.1 % (ref 0–0.5)
KETONES UR QL STRIP: NEGATIVE
LEUKOCYTE ESTERASE UR QL STRIP.AUTO: NEGATIVE
LYMPHOCYTES # BLD AUTO: 2.2 10*3/MM3 (ref 0.7–3.1)
LYMPHOCYTES NFR BLD AUTO: 25.3 % (ref 19.6–45.3)
MCH RBC QN AUTO: 30.5 PG (ref 26.6–33)
MCHC RBC AUTO-ENTMCNC: 33.6 G/DL (ref 31.5–35.7)
MCV RBC AUTO: 90.7 FL (ref 79–97)
MONOCYTES # BLD AUTO: 0.76 10*3/MM3 (ref 0.1–0.9)
MONOCYTES NFR BLD AUTO: 8.7 % (ref 5–12)
NEUTROPHILS NFR BLD AUTO: 5.12 10*3/MM3 (ref 1.7–7)
NEUTROPHILS NFR BLD AUTO: 58.9 % (ref 42.7–76)
NITRITE UR QL STRIP: NEGATIVE
NRBC BLD AUTO-RTO: 0 /100 WBC (ref 0–0.2)
PH UR STRIP.AUTO: 6.5 [PH] (ref 5–8)
PLATELET # BLD AUTO: 358 10*3/MM3 (ref 140–450)
PMV BLD AUTO: 8.9 FL (ref 6–12)
POTASSIUM SERPL-SCNC: 4 MMOL/L (ref 3.5–5.2)
PROT ?TM UR-MCNC: <4 MG/DL
PROT SERPL-MCNC: 7.3 G/DL (ref 6–8.5)
PROT UR QL STRIP: NEGATIVE
PROT/CREAT UR: NORMAL MG/G{CREAT}
RBC # BLD AUTO: 4.72 10*6/MM3 (ref 4.14–5.8)
RBC # UR STRIP: NORMAL /HPF
REF LAB TEST METHOD: NORMAL
SODIUM SERPL-SCNC: 138 MMOL/L (ref 136–145)
SP GR UR STRIP: 1.01 (ref 1–1.03)
SQUAMOUS #/AREA URNS HPF: NORMAL /HPF
UROBILINOGEN UR QL STRIP: NORMAL
WBC # UR STRIP: NORMAL /HPF
WBC NRBC COR # BLD AUTO: 8.7 10*3/MM3 (ref 3.4–10.8)

## 2025-02-10 PROCEDURE — 83880 ASSAY OF NATRIURETIC PEPTIDE: CPT | Performed by: INTERNAL MEDICINE

## 2025-02-10 PROCEDURE — 99214 OFFICE O/P EST MOD 30 MIN: CPT | Performed by: INTERNAL MEDICINE

## 2025-02-10 PROCEDURE — 85025 COMPLETE CBC W/AUTO DIFF WBC: CPT | Performed by: INTERNAL MEDICINE

## 2025-02-10 PROCEDURE — 80053 COMPREHEN METABOLIC PANEL: CPT | Performed by: INTERNAL MEDICINE

## 2025-02-10 PROCEDURE — 36415 COLL VENOUS BLD VENIPUNCTURE: CPT | Performed by: INTERNAL MEDICINE

## 2025-02-10 PROCEDURE — 84156 ASSAY OF PROTEIN URINE: CPT | Performed by: INTERNAL MEDICINE

## 2025-02-10 PROCEDURE — 82570 ASSAY OF URINE CREATININE: CPT | Performed by: INTERNAL MEDICINE

## 2025-02-10 PROCEDURE — 85379 FIBRIN DEGRADATION QUANT: CPT | Performed by: INTERNAL MEDICINE

## 2025-02-10 PROCEDURE — 81001 URINALYSIS AUTO W/SCOPE: CPT | Performed by: INTERNAL MEDICINE

## 2025-02-10 PROCEDURE — 82550 ASSAY OF CK (CPK): CPT | Performed by: INTERNAL MEDICINE

## 2025-02-10 NOTE — PROGRESS NOTES
Chief Complaint  Foot Swelling (Patient c/o bilateral foot/ankle with swelling and some pain for 1 week )    Vernon Muller is a 35 y.o. male who presents to [facility]     History of Present Illness  The patient is a 35-year-old male who presents today with a chief complaint of bilateral lower extremity swelling, including the feet and ankles extending up to his mid-calf area.    He first observed the swelling in his lower extremities last week, initially noticing that his socks were fitting more snugly. He reports no associated pain but mentions mild discomfort in his feet when standing. He has been attempting to alleviate the swelling by elevating his legs during rest periods. He recalls wearing tight ski shoes during a recent skiing trip on 01/24/2025 and experiencing significant foot pain due to prolonged standing at work prior to the onset of the swelling. He does not report any unusual urinary symptoms such as foaminess or frothiness. He also reports occasional blood in his stool, a symptom he has experienced previously. He recently participated in a volleyball game without noticing any leg or foot pain or swelling afterward. He has not engaged in any strenuous physical activity since the onset of the swelling. He has a history of a calf strain approximately a year ago, for which he used a sleeve that provided some relief. He has gained weight since the birth of his child last year. He typically consumes coffee in the morning and attempts to maintain hydration throughout the day. He reports no alcohol consumption on the day of or the day prior to the onset of the swelling. He has a diagnosis of sleep apnea and is scheduled to start CPAP therapy following a recent sleep study.    Supplemental Information  He has GERD and takes Gaviscon for it. He has joint pain from Lipitor and started taking CoQ10.    SOCIAL HISTORY  He does not drink alcohol.    FAMILY  "HISTORY  He does not have a family history of clots.    MEDICATIONS  Current: Lipitor, CoQ10, Gaviscon        Patient Care Team:  Ingris Reed APRN as PCP - General (Nurse Practitioner)  Review of Systems  Full review of systems obtained and pertinent positives states in above HPI.  Otherwise all others reviewed and are negative.    Objective   Vital Signs:   Vitals:    02/10/25 1621   BP: 116/70   BP Location: Left arm   Patient Position: Sitting   Cuff Size: Adult   Pulse: 79   Temp: 97.6 °F (36.4 °C)   TempSrc: Temporal   SpO2: 98%   Weight: 115 kg (253 lb)   Height: 180.3 cm (71\")     Body mass index is 35.29 kg/m².    Wt Readings from Last 3 Encounters:   02/10/25 115 kg (253 lb)   01/08/25 113 kg (248 lb 9.6 oz)   01/03/25 113 kg (248 lb 3.2 oz)     BP Readings from Last 3 Encounters:   02/10/25 116/70   01/08/25 112/70   01/03/25 121/67       Health Maintenance   Topic Date Due    TDAP/TD VACCINES (1 - Tdap) Never done    BMI FOLLOWUP  02/20/2025    ANNUAL PHYSICAL  07/02/2025    LIPID PANEL  01/24/2026    HEPATITIS C SCREENING  Completed    INFLUENZA VACCINE  Completed    Pneumococcal Vaccine 0-64  Aged Out    COVID-19 Vaccine  Discontinued       Physical Exam   GEN:NAD, well nourished  HEENT:NCAT, PERRL, EOMI, OP clear, MMM  NECK:supple, no JVD, no carotid bruits  CVA:RRR s1, s2 no m/r/g  CHEST:CTA B no wheezes or rhonchi  ABD:soft, nontender, nondistended +bs  EXT:no c/c/trace to 1+ lower extremity swelling right greater than left 2+PP B  NEURO:CN II-XII grossly nonfocal, no evidence of asterixes or tremor  PSYCH: appropriate mood and affect  :deferred  SKIN: warm, dry, intact, no lesions or rashes    Physical Exam  Vital Signs  Blood pressure is normal.      Result Review   The following data was reviewed by: Stephanie Cortez MD on 02/10/2025:  [x]  Tests & Results  []  Hospitalization/Emergency Department/Urgent Care  []  Internal/External Consultant Notes    Results        Procedures "          ASSESSMENT/PLAN  Diagnoses and all orders for this visit:    1. Bilateral leg edema (Primary)  -     Comprehensive metabolic panel  -     CBC & Differential  -     Urinalysis With Microscopic - Urine, Clean Catch  -     Protein / Creatinine Ratio, Urine - Urine, Clean Catch  -     CK  -     proBNP  -     D-dimer, Quantitative    2. Mixed hyperlipidemia    3. Obesity (BMI 30-39.9)    4. Prediabetes    Other orders  -     IMAGING SCANNED        Assessment & Plan  1. Bilateral lower extremity edema.  The patient's anion gap is elevated at 19, which could be indicative of renal or pulmonary issues, alcohol consumption, or severe dehydration. His blood pressure readings are within normal limits. The possibility of a clot is considered unlikely at this point. The potential for heart failure will be evaluated. His current medication regimen does not appear to be the cause of the edema. He is advised to use compression socks to help manage the swelling. A series of tests will be conducted, including a CK test to rule out muscle breakdown, a D-dimer test to exclude the presence of a clot, and a comprehensive metabolic panel to assess liver and kidney function. A urinalysis will also be performed to check for proteinuria. He will be informed of the results via Weft. If the D-dimer test returns elevated, an ultrasound will be considered to further investigate the possibility of a clot.              Clarence Muller  reports that he has never smoked. He has quit using smokeless tobacco.   FOLLOW UP  7/3/2025  Patient was given instructions and counseling regarding his condition or for health maintenance advice. Please see specific information pulled into the AVS if appropriate.       Patient or patient representative verbalized consent for the use of Ambient Listening during the visit with  Stephanie Cortez MD for chart documentation. 2/19/2025  19:03 EST    Please note that portions of this note were completed with a  voice recognition program.      Stephanie Cortez MD  02/10/25  17:18 EST

## 2025-02-11 LAB — NT-PROBNP SERPL-MCNC: <36 PG/ML (ref 0–450)

## 2025-02-13 ENCOUNTER — PATIENT MESSAGE (OUTPATIENT)
Dept: FAMILY MEDICINE CLINIC | Age: 35
End: 2025-02-13
Payer: COMMERCIAL

## 2025-02-13 DIAGNOSIS — R60.0 BILATERAL LEG EDEMA: Primary | ICD-10-CM

## 2025-02-13 NOTE — PROGRESS NOTES
As mentioned your ddimer is elevated and we will obtain lower extremity doppler ultrasound to rule out a clot. Your other tests were all negative.

## 2025-02-17 DIAGNOSIS — R60.0 BILATERAL LEG EDEMA: ICD-10-CM

## 2025-02-28 DIAGNOSIS — E78.2 MIXED HYPERLIPIDEMIA: ICD-10-CM

## 2025-03-03 RX ORDER — ATORVASTATIN CALCIUM 20 MG/1
20 TABLET, FILM COATED ORAL DAILY
Qty: 90 TABLET | Refills: 1 | Status: SHIPPED | OUTPATIENT
Start: 2025-03-03

## 2025-03-31 ENCOUNTER — TELEPHONE (OUTPATIENT)
Dept: SLEEP MEDICINE | Facility: HOSPITAL | Age: 35
End: 2025-03-31

## 2025-04-23 ENCOUNTER — OFFICE VISIT (OUTPATIENT)
Dept: SLEEP MEDICINE | Facility: HOSPITAL | Age: 35
End: 2025-04-23
Payer: COMMERCIAL

## 2025-04-23 ENCOUNTER — APPOINTMENT (OUTPATIENT)
Dept: SLEEP MEDICINE | Facility: HOSPITAL | Age: 35
End: 2025-04-23
Payer: COMMERCIAL

## 2025-04-23 VITALS
DIASTOLIC BLOOD PRESSURE: 68 MMHG | SYSTOLIC BLOOD PRESSURE: 111 MMHG | OXYGEN SATURATION: 96 % | HEART RATE: 66 BPM | HEIGHT: 71 IN | WEIGHT: 245 LBS | BODY MASS INDEX: 34.3 KG/M2

## 2025-04-23 DIAGNOSIS — G47.33 OSA ON CPAP: Primary | ICD-10-CM

## 2025-04-23 DIAGNOSIS — R03.0 ELEVATED BP WITHOUT DIAGNOSIS OF HYPERTENSION: ICD-10-CM

## 2025-04-23 DIAGNOSIS — G47.34 SLEEP RELATED HYPOXIA: ICD-10-CM

## 2025-04-23 DIAGNOSIS — R73.03 PREDIABETES: ICD-10-CM

## 2025-04-23 PROCEDURE — G0463 HOSPITAL OUTPT CLINIC VISIT: HCPCS

## 2025-04-23 NOTE — PROGRESS NOTES
Sleep Disorder Follow Up    Patient Name: Clarence Muller  Age/Sex: 35 y.o. male  : 1990  MRN: [id number]    Date of Encounter Visit: 25   Referring Provider: Sally Smith MD  Place of Service: [facility]  Patient Care Team:  System, Provider Not In as PCP - General    PROBLEM LIST:  Obstructive sleep apnea  Sleep-related hypoxemia  Prediabetes  Obesity    History of Present Illness:  Clarence Muller is a 35 y.o. male who is here for follow up on ADELFO. Patient was last seen in the office on 2025.  Since last visit, patient had a home sleep study on 2025 that was positive for moderate sleep apnea with AHI of 15 along with significant hypoxemia with 32 minutes in the hypoxemic range with a clustering pattern noted  Auto CPAP 6-20 was ordered however patient did not follow-up after the CPAP initial set up  Patient requested to be seen recently because he was being episode of waking up at night with difficulty going back to sleep and wanted to make sure that is not sleep apnea related.  Patient uses machine every night with no complaints from the mask or the pressure.  He was on the nose mask with the chin strap and did better after he switched to the FFM   Patient uses a fullface mask, which does  fit well. Patient minimal air leak.  No dry mouth.   Patient sleeps better and has a deeper sleep with the machine and feels more energy during the day time.  Currently on auto CPAP 6-20 cm H20.  Charlotte Sleepiness Scale (ESS) is 6.  Compliance download was reviewed and documented below.  Weight is down by 3 pounds since last visit.  Other comorbidities include elevated blood pressure prediabetes obesity and sleep apnea    Review of Systems:   A ten-system review was conducted and was negative except for the following: Nasal congestion acid reflux and anxiety.        Past Medical History:  Past medical, surgical, social, and family history, except as mentioned above, was unchanged  "from the last visit.     Past Medical History:   Diagnosis Date    GERD (gastroesophageal reflux disease)     Hyperlipidemia     Seasonal allergies        Past Surgical History:   Procedure Laterality Date    ENDOSCOPY      HERNIA REPAIR         Home Medications:     Current Outpatient Medications:     atorvastatin (LIPITOR) 20 MG tablet, TAKE 1 TABLET BY MOUTH DAILY, Disp: 90 tablet, Rfl: 1    omeprazole (priLOSEC) 20 MG capsule, Take 1 capsule by mouth Daily., Disp: , Rfl:     aluminum hydroxide-mag carbonate (GAVISCON EXTRA RELIEF) 160-105 MG chewable tablet chewable tablet, Chew 4 (Four) Times a Day With Meals & at Bedtime. (Patient not taking: Reported on 4/23/2025), Disp: , Rfl:     cetirizine (ZyrTEC Allergy) 10 MG tablet, Take 1 tablet by mouth Daily. (Patient not taking: Reported on 1/8/2025), Disp: , Rfl:     Coenzyme Q10 (CoQ10) 400 MG capsule, , Disp: , Rfl:     Allergies:  No Known Allergies    Past Social History:  Social History     Socioeconomic History    Marital status:    Tobacco Use    Smoking status: Never    Smokeless tobacco: Former   Vaping Use    Vaping status: Never Used   Substance and Sexual Activity    Alcohol use: Yes     Comment: social     Drug use: Never    Sexual activity: Yes     Partners: Female       Past Family History:  Family History   Problem Relation Age of Onset    No Known Problems Mother     Alcohol abuse Father     Hyperlipidemia Father     Heart attack Father     Pancreatitis Father     Liver disease Father     Hyperlipidemia Paternal Grandmother     Hyperlipidemia Paternal Grandfather          Objective:        Vital Signs:   Visit Vitals  /68 (BP Location: Left arm, Patient Position: Sitting)   Pulse 66   Ht 180.3 cm (70.98\")   Wt 111 kg (245 lb)   SpO2 96%   BMI 34.19 kg/m²     Wt Readings from Last 3 Encounters:   04/23/25 111 kg (245 lb)   02/10/25 115 kg (253 lb)   01/08/25 113 kg (248 lb 9.6 oz)          Physical Exam:   GEN:  No acute distress, " alert, cooperative, well developed, obese   EYES:   Sclerae clear. No icterus. PERRL. Normal EOM  ENT:   External ears/nose normal, no oral lesions, no thrush, mucous membranes moist, Septum midline. Mallampati III airway. Redundant membranous soft palate, enlarged uvula   NECK:  Supple, midline trachea, no JVD  LUNGS: Normal chest on inspection, CTAB, no wheezes. No rhonchi. No crackles. Respirations regular, even and unlabored.   CV:  Regular rhythm and rate. Normal S1/S2. No murmurs, gallops, or rubs noted.  ABD:  Soft, nontender and nondistended. Normal bowel sounds. No guarding  EXT:  Moves all extremities well. No cyanosis. No redness. No edema.   Skin: Dry, intact, no bleeding    Diagnostic Data:  Home sleep study 1/16/2025       Respiratory Disturbance Events:     Body weight 112.8 kg with a BMI of 34.7 kg/m²  Recording time 497 minutes monitoring time 466.5 minutes  Respiratory summary was positive for moderate ADELFO with AHI of 15.1 while in the supine position throughout the majority of the study  Sleep hypoxemia was significant with a sandoval desaturation down to 72% with 32.2 minutes spent in hypoxemic range  Nonspecific clustering was noted  Percentage of snoring 85.8%  Heart rate was fluctuating within the normal range with scattered episode of tachycardia fastest heart rate 112  Impression:    Moderate ADELFO  Sleep-related hypoxemia  Snoring  Plan:    Treatment is recommended initiate auto CPAP 6-20 cm H2O    Compliance download from CPAP 3/17/2025 - 4/15/2025 showed 87% adherence with average nightly use of 5 hours and 30 minutes per night on the nights used  On auto CPAP 6-20 with a median/95th percentile pressure of 7.6/11.7 cm H2O  Minimal air leak with a median/95th percentile of 1.2/10.6 L/min  Good control of the sleep apnea with residual AHI of 0.8      Assessment and Plan:       ICD-10-CM ICD-9-CM   1. ADELFO on CPAP  G47.33 327.23   2. Prediabetes  R73.03 790.29   3. Elevated BP without diagnosis of  hypertension  R03.0 796.2   4. Sleep related hypoxia  G47.34 327.24       Recommendations:   The results of the sleep study was reviewed and discussed with the patient as well as the result from the compliance download  We are dealing with a moderate case of sleep apnea with some clustering that is likely REM related however could not confirm without the EEG  With significant hypoxemia and with moderate sleep apnea treatment is definitely recommended to be continued and he is doing great on the CPAP with good control  Patient is waking up at night at times and this might be from the pressure going low but higher and we discussed the option of lowering the pressure  We will switch the setting to auto CPAP 6-12 cm H2O  We will review the compliance download to make sure that were not compromising the control and consider further adjustment if needed  The current mask is fitting properly and he has very minimal air leak so it is something I would recommend to continue unless the patient has subjective discomfort with it  Will follow-up in 3 months after having enough data on the newer setting before considering any further adjustment and then yearly thereafter once his sleep apnea is controlled  We did discuss the impact of weight on sleep apnea and weight loss is recommended, we can consider reevaluating if substantial weight loss has been achieved especially if the patient stopped snoring when not using the CPAP or started sleeping as well off the CPAP as he does on the CPAP       Orders Placed This Encounter   Procedures    PAP Therapy     No orders of the defined types were placed in this encounter.    No follow-ups on file.    Sally Smith MD   [facility]   04/23/25  15:53 EDT    Dictated utilizing Dragon dictation

## 2025-07-03 ENCOUNTER — OFFICE VISIT (OUTPATIENT)
Dept: FAMILY MEDICINE CLINIC | Age: 35
End: 2025-07-03
Payer: COMMERCIAL

## 2025-07-03 VITALS
HEIGHT: 71 IN | HEART RATE: 88 BPM | OXYGEN SATURATION: 98 % | SYSTOLIC BLOOD PRESSURE: 124 MMHG | DIASTOLIC BLOOD PRESSURE: 76 MMHG | WEIGHT: 252.6 LBS | TEMPERATURE: 98.3 F | BODY MASS INDEX: 35.36 KG/M2

## 2025-07-03 DIAGNOSIS — R73.03 PREDIABETES: ICD-10-CM

## 2025-07-03 DIAGNOSIS — E78.2 MIXED HYPERLIPIDEMIA: ICD-10-CM

## 2025-07-03 DIAGNOSIS — Z00.00 ANNUAL PHYSICAL EXAM: Primary | ICD-10-CM

## 2025-07-03 DIAGNOSIS — G47.33 OSA ON CPAP: ICD-10-CM

## 2025-07-03 PROCEDURE — 99395 PREV VISIT EST AGE 18-39: CPT | Performed by: NURSE PRACTITIONER

## 2025-07-03 RX ORDER — ATORVASTATIN CALCIUM 20 MG/1
20 TABLET, FILM COATED ORAL NIGHTLY
Qty: 90 TABLET | Refills: 3 | Status: SHIPPED | OUTPATIENT
Start: 2025-07-03

## 2025-07-03 NOTE — PROGRESS NOTES
Chief Complaint  Hyperlipidemia (HIEN from [facility]- 6 month f/u HLD)    Patient was given instructions and counseling regarding his condition or for health maintenance advice. Please see specific information pulled into the AVS if appropriate.     Patient or patient representative verbalized consent for the use of Ambient Listening during the visit with  SEYMOUR Doran for chart documentation. 7/3/2025  13:34 EDT      Subjective          Clarence Muller presents to [facility]    History of Present Illness  The patient is a 35-year-old male who presents to establish care with a new primary care provider, as his previous provider has left the practice. He is also here for his annual exam and to follow up on hyperlipidemia.      History of Present Illness      Past Medical History:   Diagnosis Date    GERD (gastroesophageal reflux disease)     Hyperlipidemia     Seasonal allergies        No Known Allergies     Past Surgical History:   Procedure Laterality Date    ENDOSCOPY      HERNIA REPAIR          Social History     Tobacco Use    Smoking status: Never    Smokeless tobacco: Former   Substance Use Topics    Alcohol use: Yes     Comment: social        Family History   Problem Relation Age of Onset    No Known Problems Mother     Alcohol abuse Father     Hyperlipidemia Father     Heart attack Father     Pancreatitis Father     Liver disease Father     Hyperlipidemia Paternal Grandmother     Hyperlipidemia Paternal Grandfather         Health Maintenance Due   Topic Date Due    TDAP/TD VACCINES (1 - Tdap) Never done        Current Outpatient Medications on File Prior to Visit   Medication Sig    omeprazole (priLOSEC) 20 MG capsule Take 1 capsule by mouth Daily.    [DISCONTINUED] atorvastatin (LIPITOR) 20 MG tablet TAKE 1 TABLET BY MOUTH DAILY    [DISCONTINUED] cetirizine (ZyrTEC Allergy) 10 MG tablet Take 1 tablet by mouth Daily.    aluminum hydroxide-mag carbonate (GAVISCON  "EXTRA RELIEF) 160-105 MG chewable tablet chewable tablet Chew 4 (Four) Times a Day With Meals & at Bedtime. (Patient not taking: Reported on 7/3/2025)    [DISCONTINUED] Coenzyme Q10 (CoQ10) 400 MG capsule  (Patient not taking: Reported on 7/3/2025)     No current facility-administered medications on file prior to visit.       Immunization History   Administered Date(s) Administered    COVID-19 (MODERNA) 1st,2nd,3rd Dose Monovalent 02/03/2021, 03/03/2021    COVID-19 (MODERNA) Monovalent Original Booster 01/02/2022    Fluzone  >6mos 01/03/2025    Fluzone (or Fluarix & Flulaval for VFC) >6mos 11/10/2018, 10/18/2021, 09/30/2022, 10/16/2023    Hepatitis A 12/06/2018       Review of Systems   Constitutional: Negative.  Negative for fatigue and fever.   HENT: Negative.     Eyes: Negative.    Respiratory:  Negative for cough, shortness of breath and wheezing.    Cardiovascular:  Negative for chest pain, palpitations and leg swelling.   Gastrointestinal: Negative.  Negative for abdominal pain.   Genitourinary:  Negative for decreased urine volume, dysuria, frequency and nocturia.   Musculoskeletal:  Negative for gait problem.   Skin: Negative.    Neurological:  Negative for tremors, seizures, weakness, memory problem and confusion.   Psychiatric/Behavioral: Negative.  Negative for self-injury and suicidal ideas.         Objective     /76 (BP Location: Left arm, Patient Position: Sitting, Cuff Size: Large Adult)   Pulse 88   Temp 98.3 °F (36.8 °C) (Oral)   Ht 180.3 cm (70.98\")   Wt 115 kg (252 lb 9.6 oz)   SpO2 98%   BMI 35.25 kg/m²       Physical Exam  Constitutional:       Appearance: Normal appearance.   Neck:      Vascular: No carotid bruit.   Cardiovascular:      Rate and Rhythm: Normal rate and regular rhythm.      Heart sounds: Normal heart sounds.   Pulmonary:      Effort: Pulmonary effort is normal.      Breath sounds: Normal breath sounds.   Musculoskeletal:         General: Normal range of motion. "   Skin:     General: Skin is warm and dry.   Neurological:      General: No focal deficit present.      Mental Status: He is alert.   Psychiatric:         Mood and Affect: Mood normal.         Behavior: Behavior normal.         Result Review :         Common labs          1/24/2025    09:04 2/10/2025    17:39   Common Labs   Glucose 89  98    BUN 14  14    Creatinine 0.71  0.78    Sodium 137  138    Potassium 4.0  4.0    Chloride 95  102    Calcium 9.5  9.3    Albumin 4.2  4.2    Total Bilirubin 0.7  0.2    Alkaline Phosphatase 72  61    AST (SGOT) 17  19    ALT (SGPT) 30  30    WBC 9.54  8.70    Hemoglobin 15.6  14.4    Hematocrit 46.5  42.8    Platelets 267  358    Total Cholesterol 158     Triglycerides 108     HDL Cholesterol 38     LDL Cholesterol  100     Hemoglobin A1C 5.60       CMP          1/24/2025    09:04 2/10/2025    17:39   CMP   Glucose 89  98    BUN 14  14    Creatinine 0.71  0.78    EGFR 125.0  120.8    Sodium 137  138    Potassium 4.0  4.0    Chloride 95  102    Calcium 9.5  9.3    Total Protein 7.1  7.3    Albumin 4.2  4.2    Globulin 2.9  3.1    Total Bilirubin 0.7  0.2    Alkaline Phosphatase 72  61    AST (SGOT) 17  19    ALT (SGPT) 30  30    Albumin/Globulin Ratio 1.4  1.4    BUN/Creatinine Ratio 19.7  17.9    Anion Gap 19.3  7.8      CBC w/diff          1/24/2025    09:04 2/10/2025    17:39   CBC w/Diff   WBC 9.54  8.70    RBC 5.14  4.72    Hemoglobin 15.6  14.4    Hematocrit 46.5  42.8    MCV 90.5  90.7    MCH 30.4  30.5    MCHC 33.5  33.6    RDW 12.6  11.8    Platelets 267  358    Neutrophil Rel %  58.9    Immature Granulocyte Rel %  1.1    Lymphocyte Rel %  25.3    Monocyte Rel %  8.7    Eosinophil Rel %  5.1    Basophil Rel %  0.9      Lipid Panel          1/24/2025    09:04   Lipid Panel   Total Cholesterol 158    Triglycerides 108    HDL Cholesterol 38    VLDL Cholesterol 20    LDL Cholesterol  100    LDL/HDL Ratio 2.59        Most Recent A1C          1/24/2025    09:04   HGBA1C Most  Recent   Hemoglobin A1C 5.60                Pulmonary Results Scan (04/23/2025)     Results                 Assessment and Plan      Diagnoses and all orders for this visit:    1. Annual physical exam (Primary)    2. Mixed hyperlipidemia  -     Lipid Panel; Future  -     atorvastatin (LIPITOR) 20 MG tablet; Take 1 tablet by mouth Every Night.  Dispense: 90 tablet; Refill: 3    3. Prediabetes  -     Hemoglobin A1c; Future    4. ADELFO on CPAP  Comments:  Followed by Sleep Md ., using CPAP        Assessment & Plan              Follow Up     Return in about 1 year (around 7/3/2026) for Annual physical.

## 2025-08-01 ENCOUNTER — TELEPHONE (OUTPATIENT)
Dept: FAMILY MEDICINE CLINIC | Age: 35
End: 2025-08-01
Payer: COMMERCIAL

## 2025-08-01 ENCOUNTER — LAB (OUTPATIENT)
Dept: LAB | Facility: HOSPITAL | Age: 35
End: 2025-08-01
Payer: COMMERCIAL

## 2025-08-01 DIAGNOSIS — E78.2 MIXED HYPERLIPIDEMIA: ICD-10-CM

## 2025-08-01 DIAGNOSIS — R73.03 PREDIABETES: ICD-10-CM

## 2025-08-01 LAB
CHOLEST SERPL-MCNC: 197 MG/DL (ref 0–200)
HBA1C MFR BLD: 5.4 % (ref 4.8–5.6)
HDLC SERPL-MCNC: 41 MG/DL (ref 40–60)
LDLC SERPL CALC-MCNC: 128 MG/DL (ref 0–100)
LDLC/HDLC SERPL: 3.03 {RATIO}
TRIGL SERPL-MCNC: 158 MG/DL (ref 0–150)
VLDLC SERPL-MCNC: 28 MG/DL (ref 5–40)

## 2025-08-01 PROCEDURE — 83036 HEMOGLOBIN GLYCOSYLATED A1C: CPT

## 2025-08-01 PROCEDURE — 36415 COLL VENOUS BLD VENIPUNCTURE: CPT

## 2025-08-01 PROCEDURE — 80061 LIPID PANEL: CPT

## 2025-08-05 ENCOUNTER — OFFICE VISIT (OUTPATIENT)
Dept: SLEEP MEDICINE | Facility: HOSPITAL | Age: 35
End: 2025-08-05
Payer: COMMERCIAL

## 2025-08-05 VITALS
DIASTOLIC BLOOD PRESSURE: 67 MMHG | OXYGEN SATURATION: 96 % | WEIGHT: 258 LBS | SYSTOLIC BLOOD PRESSURE: 115 MMHG | BODY MASS INDEX: 36.12 KG/M2 | HEART RATE: 74 BPM | HEIGHT: 71 IN

## 2025-08-05 DIAGNOSIS — R73.03 PREDIABETES: ICD-10-CM

## 2025-08-05 DIAGNOSIS — G47.33 OSA ON CPAP: Primary | ICD-10-CM

## 2025-08-05 DIAGNOSIS — E66.9 OBESITY (BMI 30-39.9): ICD-10-CM

## 2025-08-05 DIAGNOSIS — G47.34 SLEEP RELATED HYPOXIA: ICD-10-CM

## 2025-08-05 PROCEDURE — G0463 HOSPITAL OUTPT CLINIC VISIT: HCPCS
